# Patient Record
(demographics unavailable — no encounter records)

---

## 2017-08-22 NOTE — RADIOLOGY REPORT (SQ)
EXAM DESCRIPTION:  CAROTID DOPPLER



COMPLETED DATE/TIME:  8/22/2017 1:58 pm



REASON FOR STUDY:  OCCLUSION / STENOSIS I65.29  OCCLUSION AND STENOSIS OF UNSPECIFIED CAROTID ARTERY



COMPARISON:  None.



TECHNIQUE:  Grayscale ultrasound, Doppler velocity and spectra, and color Doppler images acquired of 
the extra-cranial carotid and vertebral arteries. Images stored on PACS.



LIMITATIONS:  None.



FINDINGS:  RIGHT CAROTID

CCA Velocities: Within normal limits.

ICA Velocities

 Peak systolic 0.63 m/s.

 End diastolic 0.23 m/s.

Proximal ICA/CCA peak systolic ratio 1.3.

Spectra normal. No significant plaque.

LEFT CAROTID

CCA Velocities: Within normal limits.

ICA Velocities

 Peak systolic 0.41 m/s.

 End diastolic 0.14 m/s.

Proximal ICA/CCA peak systolic ratio 2.1.

Spectra normal. No significant plaque.

VERTEBRAL ARTERIES: Antegrade flow.  Normal waveforms.

SUBCLAVIAN ARTERIES: No finding.

OTHER: No other significant finding.



IMPRESSION:  NO HEMODYNAMICALLY SIGNIFICANT STENOSIS.  Status post bilateral carotid endarterectomy b
ased on patient history



COMMENT:  Quality ID #195:  Velocity criteria are extrapolated from the diameter data as defined by t
he Society of Radiologists in Ultrasound Consensus Conference. Radiology 2003: 229; 340-346.



TECHNICAL DOCUMENTATION:  JOB ID:  [id number]

 2011 [employer]- All Rights Reserved

## 2017-09-26 NOTE — WOMENS IMAGING REPORT
EXAM DESCRIPTION:  3D SCREENING MAMMO BILAT



COMPLETED DATE/TIME:  9/26/2017 7:25 am



REASON FOR STUDY:  SCREENING MAMMO Z12.31  ENCNTR SCREEN MAMMOGRAM FOR MALIGNANT NEOPLASM OF DILLON



COMPARISON:  9/9/2016 and 8/11/2015.



TECHNIQUE:  Standard craniocaudal and mediolateral oblique views of each breast recorded using digita
l acquisition and breast tomosynthesis.



LIMITATIONS:  None.



FINDINGS:  Findings present which are benign by mammographic criteria.  No suspicious masses, calcifi
cations or architectural distortion.

Pertinent benign findings: Stable calcifications.

Read with the assistance of CAD.

.[facility] - R2 Cenova Version 1.3

.[facility] - R2 Cenova Version 1.3

.[facility] - R2 Cenova Version 2.4

.[facility] - R2 Cenova Version 2.4

.[facility] - R2  Version 9.2

Benign mammographic findings may include one or more of the following:  Smooth masses, popcorn/rim/co
arse calcifications, asymmetries, post-procedure changes, and lesions with long-standing stability.



IMPRESSION:  BENIGN MAMMOGRAPHIC FINDINGS.  BIRADS 2



BREAST DENSITY:  c. The breasts are heterogeneously dense, which may obscure small masses.



BIRAD:  2 BENIGN FINDING(S)



RECOMMENDATION:  RECOMMENDATION: ROUTINE SCREENING



COMMENT:  The patient has been notified of the results by letter per SA requirements. Additional no
tification policies are in place for contacting patient with suspicious or incomplete findings.

Quality ID #225: The American College of Radiology recommends an annual screening mammogram for women
 aged 40 years or over. This facility utilizes a reminder system to ensure that all patients receive 
reminder letters, and/or direct phone calls for appointments. This includes reminders for routine scr
eening mammograms, diagnostic mammograms, or other Breast Imaging Interventions when appropriate.  Th
is patient will be placed in the appropriate reminder system.

The American College of Radiology (ACR) has developed recommendations for screening MRI of the breast
s in certain patient populations, to be used in conjunction with mammography.  Breast MRI surveillanc
e may be appropriate for women with more than 20% lifetime risk of developing breast cancer  as deter
mined by genetic testing, significant family history of the disease, or history of mantle radiation f
or Hodgkins Disease.  ACR Practice Guidelines 2008.

[employer]



PQRS 6045F: Fluoroscopic imaging is not utilized for breast tomosynthesis.



TECHNICAL DOCUMENTATION:  FINDING NUMBER: (1)

ASSESSMENT: (1)

JOB ID:  [id number]

 2011 [employer]- All Rights Reserved

## 2018-01-30 NOTE — RADIOLOGY REPORT (SQ)
EXAM DESCRIPTION:  CT HEAD WITHOUT



COMPLETED DATE/TIME:  1/30/2018 8:02 am



REASON FOR STUDY:  DIZZINESS AND GIDDINESS R10.13  EPIGASTRIC PAIN R42  DIZZINESS AND GIDDINESS



COMPARISON:  11/10/2016.



TECHNIQUE:  Axial images acquired through the brain without intravenous contrast.  Images reviewed wi
th bone, brain and subdural windows.  Images stored on PACS.

All CT scanners at this facility use dose modulation, iterative reconstruction, and/or weight based d
osing when appropriate to reduce radiation dose to as low as reasonably achievable (ALARA).

[facility]: Dose Right  [facility]: CareDose    [facility]: Dose Right    [facility]: Teradose 4D    [facility]: Smart Omgili



RADIATION DOSE:  CT Rad equipment meets quality standard of care and radiation dose reduction techniq
ues were employed. CTDIvol: 64.6 mGy. DLP: 1163 mGy-cm. mGy.



LIMITATIONS:  None.



FINDINGS:  VENTRICLES: Normal size and contour.

CEREBRUM: There is decreased attenuation in the subcortical and periventricular white matter consiste
nt with chronic white matter change demonstrating no significant change from prior study.

CEREBELLUM: No masses.  No hemorrhage.  No alteration of density.  No evidence for acute infarction.

EXTRAAXIAL SPACES: No fluid collections.  No masses.

ORBITS AND GLOBE: No intra- or extraconal masses.  Normal contour of globe without masses.

CALVARIUM: No fracture.

PARANASAL SINUSES: No fluid or mucosal thickening.

SOFT TISSUES: No mass or hematoma.

OTHER: Carious and missing teeth.  Atherosclerotic change within the intracranial vessels.



IMPRESSION:  NO SIGNIFICANT INTERVAL CHANGE.  CHRONIC WHITE MATTER CHANGE.

EVIDENCE OF ACUTE STROKE: NO.



COMMENT:  Quality ID # 436: Final reports with documentation of one or more dose reduction techniques
 (e.g., Automated exposure control, adjustment of the mA and/or kV according to patient size, use of 
iterative reconstruction technique)



TECHNICAL DOCUMENTATION:  JOB ID:  [id number]

[id number]

 2011 [employer]- All Rights Reserved

## 2018-01-30 NOTE — RADIOLOGY REPORT (SQ)
EXAM DESCRIPTION:  CT ABD/PELVIS NO ORAL OR IV



COMPLETED DATE/TIME:  1/30/2018 8:03 am



REASON FOR STUDY:  DIZZINESS AND GIDDINESS R10.13  EPIGASTRIC PAIN R42  DIZZINESS AND GIDDINESS



COMPARISON:  11/13/2016.



TECHNIQUE:  CT scan of the abdomen and pelvis performed without intravenous or oral contrast. Images 
reviewed with lung, soft tissue, and bone windows. Reconstructed coronal and sagittal MPR images revi
ewed. All images stored on PACS.

All CT scanners at this facility use dose modulation, iterative reconstruction, and/or weight based d
osing when appropriate to reduce radiation dose to as low as reasonably achievable (ALARA).

[facility]: Dose Right  [facility]: CareDose    [facility]: Dose Right    [facility]: Teradose 4D    [facility]: Smart Technologies



RADIATION DOSE:  CT Rad equipment meets quality standard of care and radiation dose reduction techniq
ues were employed. CTDIvol: 14.4 mGy. DLP: 729 mGy-cm.mGy.



LIMITATIONS:  None.



FINDINGS:  LOWER CHEST: Cardiomegaly.  Mitral and aortic valve calcification.

NON-CONTRASTED LIVER, SPLEEN, ADRENALS: Evaluation limited by lack of IV contrast. No identified sign
ificant masses.  LIVER:  Interval increase in size of thick-walled cyst within the left hepatic lobe 
measuring 11 Hounsfield units now measuring 9.6 cm in transverse diameter by 8.4 cm in AP diameter.  
There is an adjacent smaller cyst which is developed measuring 3.5 x 3.2 cm.  There is again evidence
 of a circumscribed rounded area decreased attenuation right liver the liver adjacent the gallbladder
 fossa measuring 0.6 cm and a smaller rounded area decreased attenuation laterally right lobe of live
r measuring 0.5 cm.

SPLEEN:  No abnormality seen.

ADRENALS:  Nodular appearance of adrenal suggesting hyperplasia.

PANCREAS: No masses. No peripancreatic inflammatory changes.

GALLBLADDER: Cholelithiasis.

RIGHT KIDNEY AND URETER:  Persistent cortical cyst upper pole right kidney.  Hyperdense cyst lower po
le right kidney.

LEFT KIDNEY AND URETER: No change in 6.7 x 5.6 cm septated cyst with calcified septations upper pole 
left kidney. AORTA AND RETROPERITONEUM: Atherosclerotic change of the abdominal aorta and iliac vesse
ls.  Atherosclerotic dilatation of the infrarenal abdominal aorta measuring 2.2 cm.  These findings a
re unchanged

BOWEL AND PERITONEAL CAVITY: No obvious masses or inflammatory changes. No free fluid.

APPENDIX: Normal.

PELVIS, BLADDER, AND ABDOMINAL WALL:Fibroid uterus.  .

BONES: Multilevel lumbar spondylosis.  Degenerative disc disease at L3-4.  Findings consistent with s
pondylolysis of L3 on the right and degenerative anterolisthesis of L3 on L4.

OTHER: Ascites.



IMPRESSION:  Since the prior study of 11/18/2016, there has been interval increase in size of a previ
ously described cyst left lobe of the liver now demonstrating thick-wall.  An adjacent cyst is develo
ped.  Additional small simple cyst right lobe of liver noted.  Given these findings and history of ep
igastric pain, follow-up with aspiration biopsy and culture and sensitivity could be obtained to excl
ude developing abscess.  2.  Ascites.  Cholelithiasis.



COMMENT:  Quality ID # 436: Final reports with documentation of one or more dose reduction techniques
 (e.g., Automated exposure control, adjustment of the mA and/or kV according to patient size, use of 
iterative reconstruction technique)



TECHNICAL DOCUMENTATION:  JOB ID:  [id number]

[id number]

 2011 [employer]- All Rights Reserved

## 2018-02-15 NOTE — RADIOLOGY REPORT (SQ)
EXAM DESCRIPTION:  U/S ABDOMEN LIMITED W/O DOP



COMPLETED DATE/TIME:  2/15/2018 9:11 am



REASON FOR STUDY:  CYST OF LIVER, CALCULUS OF GALLBLADDER Q44.6  CYSTIC DISEASE OF LIVER K80.20  CALC
ULUS OF GALLBLADDER W/O CHOLECYSTITIS W/O OBSTRUC



COMPARISON:  None.



TECHNIQUE:  Dynamic and static grayscale images acquired of the abdomen and recorded on PACS. Additio
nal selected color Doppler and spectral images recorded.



LIMITATIONS:  None.



FINDINGS:  PANCREAS: No masses.  Visualized pancreatic duct normal caliber.

LIVER: Large cyst with internal septations left lobe measuring about 9 x 12 cm.

LIVER VASCULATURE: Normal directional flow of the main portal vein and hepatic veins.

GALLBLADDER: Small stone.  Polyps measuring less than 6 mm.  No gallbladder wall thickening.

ULTRASOUND-DETECTED HAMPTON'S SIGN: Negative.

INTRAHEPATIC DUCTS AND COMMON DUCT: CBD and intrahepatic ducts normal caliber. No filling defects.

INFERIOR VENA CAVA: Normal flow.

AORTA: No aneurysm.

RIGHT KIDNEY:  Normal size.   Cortical cysts, the largest about 2 cm.   No solid or suspicious masses
.   No hydronephrosis.   No calcifications.

PERITONEAL AND RIGHT PLEURAL SPACE: No ascites or effusions.

OTHER: No other significant findings.



IMPRESSION:

1. Gallstones and small polyps.  No evidence of acute cholecystitis.

2. Large complex hepatic cystic lesion.  See recent CT report.



TECHNICAL DOCUMENTATION:  JOB ID:  [id number]

 [employer]- All Rights Reserved